# Patient Record
Sex: MALE | Race: WHITE
[De-identification: names, ages, dates, MRNs, and addresses within clinical notes are randomized per-mention and may not be internally consistent; named-entity substitution may affect disease eponyms.]

---

## 2017-07-10 ENCOUNTER — HOSPITAL ENCOUNTER (EMERGENCY)
Dept: HOSPITAL 47 - EC | Age: 27
Discharge: HOME | End: 2017-07-10
Payer: COMMERCIAL

## 2017-07-10 VITALS
SYSTOLIC BLOOD PRESSURE: 130 MMHG | DIASTOLIC BLOOD PRESSURE: 78 MMHG | TEMPERATURE: 98 F | RESPIRATION RATE: 18 BRPM | HEART RATE: 82 BPM

## 2017-07-10 DIAGNOSIS — H57.8: Primary | ICD-10-CM

## 2017-07-10 PROCEDURE — 99283 EMERGENCY DEPT VISIT LOW MDM: CPT

## 2017-07-10 NOTE — ED
Eye Problem HPI





- General


Chief complaint: Eye Problems


Stated complaint: Painful eye


Time Seen by Provider: 07/10/17 21:03


Source: patient, RN notes reviewed


Mode of arrival: ambulatory


Limitations: no limitations





- History of Present Illness


Initial comments: 





27-year-old male presents to the emergency Department chief complaint of left 

eye irritation.  Patient states that he was opening superglue and since then he'

s had some burning on and off to the eye.  Patient states he contacted poison 

control and told to flush his eye and he states he continued to have the 

emergency room 3 thought that he should be seen.  Patient denies any changes in 

vision any drainage from the eye.  Patient states that she stated burning type 

sensation he was concerned.  Patient states it is not currently having any 

other symptoms at this time.Patient denies any recent fever, chills, shortness 

of breath, chest pain, back pain, abdominal pain, nausea vomiting, numbness or 

tingling, dysuria or hematuria, constipation or diarrhea, headaches or visual 

changes, or any other current symptoms.





- Related Data


 Previous Rx's











 Medication  Instructions  Recorded


 


Erythromycin Ophth Oint [Romycin 1 applic LEFT EYE QID 3 Days 08/19/16





Ophth Oint]  











 Allergies











Allergy/AdvReac Type Severity Reaction Status Date / Time


 


No Known Allergies Allergy   Verified 07/10/17 20:57














Review of Systems


ROS Statement: 


Those systems with pertinent positive or pertinent negative responses have been 

documented in the HPI.





ROS Other: All systems not noted in ROS Statement are negative.





Past Medical History


Past Medical History: No Reported History


History of Any Multi-Drug Resistant Organisms: None Reported


Past Surgical History: Orthopedic Surgery


Additional Past Surgical History / Comment(s): bilat ankle,


Past Psychological History: No Psychological Hx Reported


Smoking Status: Never smoker


Past Alcohol Use History: Rare


Past Drug Use History: None Reported





General Exam


Limitations: no limitations


General appearance: alert, in no apparent distress


Head exam: Present: atraumatic, normocephalic, normal inspection


Eye exam: Present: normal appearance, PERRL, EOMI.  Absent: scleral icterus, 

conjunctival injection, periorbital swelling


ENT exam: Present: normal exam, mucous membranes moist


Neck exam: Present: normal inspection.  Absent: tenderness, meningismus, 

lymphadenopathy


Respiratory exam: Present: normal lung sounds bilaterally.  Absent: respiratory 

distress, wheezes, rales, rhonchi, stridor


Cardiovascular Exam: Present: regular rate, normal rhythm, normal heart sounds.

  Absent: systolic murmur, diastolic murmur, rubs, gallop, clicks


Neurological exam: Present: alert, oriented X3


Psychiatric exam: Present: normal affect, normal mood


Skin exam: Present: warm, dry, intact, normal color.  Absent: rash





Course


 Vital Signs











  07/10/17





  20:55


 


Temperature 98.0 F


 


Pulse Rate 82


 


Respiratory 18





Rate 


 


Blood Pressure 130/78


 


O2 Sat by Pulse 97





Oximetry 














- Reevaluation(s)


Reevaluation #1: 





07/10/17 21:39


patient's pain completely resolved with the proparacaine





Medical Decision Making





- Medical Decision Making





27-year-old male presents for left eye irritation.at this time patient 

underwent with flap examination does not show any corneal injury.  We did give 

the patient dose of Bactrim and informed to follow-up with ophthalmology 

morning.  We did discuss return parameters all patient's questions.  He stated 

he understood and is in agreement plan.  He will be discharged.





Disposition


Clinical Impression: 


 Irritation of left eye





Disposition: HOME SELF-CARE


Condition: Stable


Instructions:  Eye Wash (Into the eye)


Additional Instructions: 


Please use medication as discussed. Please follow up with family doctor if 

symptoms have not improved over the next two days. Please return to the 

emergency room if your symptoms increase or worsen or for any other concerns. 


Referrals: 


Mir Kyle MD [Primary Care Provider] - 1-2 days


Jhoan Baker MD [STAFF PHYSICIAN] - 1-2 days


Time of Disposition: 21:39

## 2017-07-18 ENCOUNTER — HOSPITAL ENCOUNTER (OUTPATIENT)
Dept: HOSPITAL 47 - RADMRIMAIN | Age: 27
Discharge: HOME | End: 2017-07-18
Payer: COMMERCIAL

## 2017-07-18 DIAGNOSIS — M54.2: ICD-10-CM

## 2017-07-18 DIAGNOSIS — M51.27: ICD-10-CM

## 2017-07-18 DIAGNOSIS — M48.06: Primary | ICD-10-CM

## 2017-07-18 DIAGNOSIS — M51.37: ICD-10-CM

## 2017-07-18 PROCEDURE — 72148 MRI LUMBAR SPINE W/O DYE: CPT

## 2017-07-18 PROCEDURE — 72141 MRI NECK SPINE W/O DYE: CPT

## 2017-07-18 NOTE — MR
EXAMINATION TYPE: MR lspine wo con

 

DATE OF EXAM: 7/18/2017

 

COMPARISON: NONE

 

HISTORY: neck pain back pain

 

TECHNIQUE: T1 and T2  axial and sagittal images of the lumbar spine are submitted.  

 

FINDINGS: There is no abnormal signal seen within the visualized spinal cord or paraspinal soft tissu
es.

 

At L1-2 there is hypertrophic change of the facets but no disc herniation or canal stenosis. No joan
inal encroachment.

 

At L2-3 there is no disc herniation or canal stenosis. No foraminal encroachment.

 

At L3-4 there is mild disc desiccation and circumferential disc bulging. Hypertrophic change of the f
acets. No Canal stenosis or foraminal encroachment.

 

At L4-5 there is central broad-based disc herniation with moderate effacement of thecal sac and canal
 stenosis. Facet arthropathy and ligamentum flavum hypertrophy with mild bilateral foraminal encroach
ment.

 

At L5-S1 there is facet arthropathy. No Canal stenosis. Circumferential disc bulging with mild forami
nal encroachment.

 

 

IMPRESSION:

1. Degenerative disc disease L3-S1 with disc desiccation.

2. Central broad-based disc herniation L4-L5 with moderate effacement of thecal sac and canal stenosi
s. Hypertrophic change of the facets and ligamentum flavum contribute to bilateral foraminal encroach
ment.

3. Circumferential disc bulging L5-S1 with mild bilateral foraminal encroachment.

 

 

 

 

 

EXAMINATION TYPE: MR dahiana wo con

 

DATE OF EXAM: 7/18/2017

 

COMPARISON: NONE

 

HISTORY: neck pain back pain

 

TECHNIQUE: T1 sagittal and coronal, T2 sagittal, and gradient echo axial views of the cervical spine 
are submitted.

 

FINDINGS: The cranial cervical junction is preserved.  There is no abnormal signal seen within the sp
inal cord or paraspinal soft tissues.

 

At C2-3 there is no disc herniation or canal stenosis. No foraminal encroachment.

 

At C3-4 there is there is no disc herniation or canal stenosis. Mild uncovertebral joint hypertrophy 
on the right. Very mild right-sided foraminal encroachment.

 

At C4-5 there is no disc herniation or canal stenosis. No foraminal encroachment.

 

At C5-6 there is disc herniation or canal stenosis. No foraminal encroachment. 

 

At C6-7 there is no disc herniation or canal stenosis. No foraminal encroachment.

 

At C7-T1 there is no disc herniation or canal stenosis. No foraminal encroachment.

 

 

IMPRESSION: 

1.  Mild uncovertebral joint hypertrophy C3-C4 on the right with very mild right-sided foraminal encr
oachment but no evidence of disc herniation or canal stenosis at any of the visualized levels.

## 2018-06-27 ENCOUNTER — HOSPITAL ENCOUNTER (EMERGENCY)
Dept: HOSPITAL 47 - EC | Age: 28
Discharge: HOME | End: 2018-06-27
Payer: COMMERCIAL

## 2018-06-27 VITALS — SYSTOLIC BLOOD PRESSURE: 128 MMHG | TEMPERATURE: 98.5 F | DIASTOLIC BLOOD PRESSURE: 70 MMHG | HEART RATE: 64 BPM

## 2018-06-27 VITALS — RESPIRATION RATE: 20 BRPM

## 2018-06-27 DIAGNOSIS — T15.91XA: Primary | ICD-10-CM

## 2018-06-27 PROCEDURE — 99283 EMERGENCY DEPT VISIT LOW MDM: CPT

## 2018-06-27 NOTE — ED
Eye Problem HPI





- General


Chief complaint: Eye Problems


Stated complaint: METAL SHAVING IN RT EYE NOT IHS


Time Seen by Provider: 06/27/18 14:52


Source: patient, RN notes reviewed


Mode of arrival: ambulatory


Limitations: no limitations





- History of Present Illness


Initial comments: 


This is a 27-year-old male who presents to the emergency department with chief 

complaint of right eye foreign body.  Patient states that approximately 2 and 

half hours ago he was grinding metal.  He states that a small shaving became 

lodged in his right eye.  He states that he did flush out the right eye and 

still felt a foreign body sensation on his drive over to the emergency 

department.  Patient states that currently he no longer has foreign body 

sensation or irritation.  He states he just wanted to be cautious.  Denies any 

vision changes, blurred vision or significant eye pain.  Does admit to minimal 

tearing.  Denies any concerns for foreign body in the left eye.  Patient does 

not wear contacts.  Denies recent illnesses or infections.  Denies fevers or 

chills, chest pain or shortness breath, abdominal pain, nausea or vomiting, 

headache or dizziness.








- Related Data


 Home Medications











 Medication  Instructions  Recorded  Confirmed


 


No Known Home Medications  06/27/18 06/27/18











 Allergies











Allergy/AdvReac Type Severity Reaction Status Date / Time


 


No Known Allergies Allergy   Verified 06/27/18 14:45














Review of Systems


ROS Statement: 


Those systems with pertinent positive or pertinent negative responses have been 

documented in the HPI.





ROS Other: All systems not noted in ROS Statement are negative.





Past Medical History


Past Medical History: No Reported History


History of Any Multi-Drug Resistant Organisms: None Reported


Past Surgical History: Orthopedic Surgery


Additional Past Surgical History / Comment(s): bilat ankle,


Past Psychological History: No Psychological Hx Reported


Smoking Status: Never smoker


Past Alcohol Use History: Rare


Past Drug Use History: None Reported





General Exam





- General Exam Comments


Initial Comments: 





General: Awake and alert, well-developed; in no apparent distress.


HEENT: Head atraumatic, normocephalic. Pupils are equal, round and reactive to 

light. Extraocular movements intact.  Bilateral conjunctiva are non-injected.  

No evidence of foreign body within the right eye.  No evidence of foreign body 

under superior or inferior eyelids.  No abrasions or ulcerations noted on 

fluorescein staining.  Oropharynx moist without erythema or exudate. 


Neck: Supple. Normal ROM. 


Cardiovascular: Regular rate and rhythm. No murmurs, rubs or gallops. Chest 

symmetrical.  


Respiratory: Lungs clear to auscultation bilaterally. No wheezes, rales or 

rhonchi. Normal respiratory effort with no use of accessory muscles. 


Musculoskeletal: Normal ROM, no tenderness bilateral upper and lower 

extremities. Ambulating normally. 


Skin: Pink, warm and dry without rashes or lesions. 


Neurological: Alert and oriented x3. CN II-XII grossly intact. Speech is fluent 

and answers are appropriate. No focal neuro deficits. 


Psychiatric: Normal mood and affect. No overt signs of depression or anxiety 

noted. 











Limitations: no limitations





Course





 Vital Signs











  06/27/18





  14:43


 


Temperature 98.2 F


 


Pulse Rate 72


 


Respiratory 20





Rate 


 


Blood Pressure 121/69


 


O2 Sat by Pulse 100





Oximetry 














Medical Decision Making





- Medical Decision Making


This is a 27-year-old male who presents to the emergency department with chief 

complaint of foreign body within the right eye.  Patient thought that he got a 

small metal shaving into his right eye approximately 2-1/2 hours ago.  He did 

flush it out.  Currently, patient denies any foreign body sensation and 

irritation.  Conjunctivae non-injected.  No evidence of foreign body within the 

eye or under eyelids.  No abrasions or ulcerations noted on fluorescein 

staining.  Extraocular movements are intact and pupils are equal round and 

reactive to light.  Patient will be started on erythromycin ointment for 

prevention of any infection if there is a minor abrasion that I am unable to 

visualize.  Vital signs are stable and patient is in no acute distress.  He 

will be discharged home at this time.  All questions answered.








Disposition


Clinical Impression: 


 Eye foreign body





Disposition: HOME SELF-CARE


Condition: Good


Instructions:  Eye Foreign Body (ED), Erythromycin (Into the eye)


Additional Instructions: 


Please apply a half inch ribbon of erythromycin to the affected eye every 6 

hours for the next 5 days.  Please follow up with primary care provider within 1

-2 days. Return to emergency department if symptoms should worsen or any 

concerns arise.  


Is patient prescribed a controlled substance at d/c from ED?: No


Referrals: 


Mir Kyle MD [Primary Care Provider] - 1-2 days


Time of Disposition: 15:38

## 2018-10-12 ENCOUNTER — HOSPITAL ENCOUNTER (OUTPATIENT)
Dept: HOSPITAL 47 - RADXRMAIN | Age: 28
Discharge: HOME | End: 2018-10-12
Payer: COMMERCIAL

## 2018-10-12 DIAGNOSIS — R93.7: Primary | ICD-10-CM

## 2018-10-12 NOTE — XR
EXAMINATION TYPE: XR ankle limited bilateral

 

DATE OF EXAM: 10/12/2018

 

CLINICAL HISTORY: Ankle pain

 

TECHNIQUE:  Frontal, and lateral of the bilateral ankle are obtained.

 

COMPARISON: None.

 

FINDINGS:  There is no acute fracture/dislocation evident in the bilateral ankle.  The ankle mortise 
appears within normal limits.  The overlying soft tissue appears unremarkable. Prominent bony density
 extending of the posterior margin of the talus bilaterally may be congenital.

 

IMPRESSION:  

1. There is no acute fracture or dislocation in the bilateral ankle.

2. There is a prominent bony protuberance extending off the posterior margin of the talus. 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 
congenital rather than representing osteochondroma. Follow-up bone scan could BE obtained as clinical
ly warranted.

## 2020-08-26 ENCOUNTER — HOSPITAL ENCOUNTER (OUTPATIENT)
Dept: HOSPITAL 47 - RAD | Age: 30
Discharge: HOME | End: 2020-08-26
Attending: PHYSICIAN ASSISTANT
Payer: COMMERCIAL

## 2020-08-26 DIAGNOSIS — M47.897: Primary | ICD-10-CM

## 2020-08-26 PROCEDURE — 72110 X-RAY EXAM L-2 SPINE 4/>VWS: CPT

## 2020-08-27 NOTE — XR
EXAMINATION TYPE: XR lumbosacral spine min 4V

 

DATE OF EXAM: 8/26/2020

 

CLINICAL HISTORY: Sciatica pain down right leg. M54.31

 

TECHNIQUE: Frontal, lateral, and oblique images of the lumbar spine are obtained.

 

COMPARISON: None

 

FINDINGS:  There are 5 lumbar type vertebral bodies identified.  The lumbar spine shows satisfactory 
alignment without evidence of acute fracture or dislocation. Vertebral body heights and disk space he
ights are within normal limits. No evidence of spondylolysis or spondylolisthesis. Mild facet arthrop
athy L5-S1. The overlying soft tissue appears unremarkable.

 

IMPRESSION:  

1. No acute fracture or dislocation is seen in the lumbar spine.

2. Mild facet arthropathy at L5-S1.

## 2020-09-07 ENCOUNTER — HOSPITAL ENCOUNTER (EMERGENCY)
Dept: HOSPITAL 47 - EC | Age: 30
Discharge: HOME | End: 2020-09-07
Payer: COMMERCIAL

## 2020-09-07 VITALS — HEART RATE: 69 BPM | TEMPERATURE: 98 F | DIASTOLIC BLOOD PRESSURE: 79 MMHG | SYSTOLIC BLOOD PRESSURE: 114 MMHG

## 2020-09-07 VITALS — RESPIRATION RATE: 18 BRPM

## 2020-09-07 DIAGNOSIS — M54.41: Primary | ICD-10-CM

## 2020-09-07 DIAGNOSIS — Z98.890: ICD-10-CM

## 2020-09-07 PROCEDURE — 96372 THER/PROPH/DIAG INJ SC/IM: CPT

## 2020-09-07 PROCEDURE — 99283 EMERGENCY DEPT VISIT LOW MDM: CPT

## 2020-09-07 NOTE — ED
Back Pain HPI





- General


Chief Complaint: Back Pain/Injury


Stated Complaint: back & leg pain


Time Seen by Provider: 09/07/20 08:36


Source: patient


Limitations: no limitations





- History of Present Illness


Initial Comments: 





Patient is a 30-year-old male presenting to emergency Department with complaints

of right-sided low back pain with some radiation into his right leg.  Patient 

states he has seen his PCP for this and they did start him on Neurontin which 

has not seemed to help.  Patient states he also has muscle relaxers at home that

he takes at nighttime and has been trying Aleve as well.  Patient states he also

has a prescription for physical therapy which he did go to the first appointment

but did not like the physical therapist so he is switching to another facility. 

Patient states the last 2 days the pain has increased so he came to the ER for 

evaluation.  He denies any injuries or falls.  He denies any fever or chills.  

He denies any set up her seizure or urinary incontinence.  He denies any lower 

back surgeries.  He has no further complaints at this time.  Upon arrival to the

ER, his vital signs are stable.





- Related Data


                                  Previous Rx's











 Medication  Instructions  Recorded


 


predniSONE [Deltasone] 20 mg PO BID 5 Days #10 tab 09/07/20











                                    Allergies











Allergy/AdvReac Type Severity Reaction Status Date / Time


 


No Known Allergies Allergy   Verified 09/07/20 08:34














Review of Systems


ROS Statement: 


Those systems with pertinent positive or pertinent negative responses have been 

documented in the HPI.





ROS Other: All systems not noted in ROS Statement are negative.





Past Medical History


Past Medical History: No Reported History


History of Any Multi-Drug Resistant Organisms: None Reported


Past Surgical History: Orthopedic Surgery


Additional Past Surgical History / Comment(s): bilat ankle,


Past Psychological History: No Psychological Hx Reported


Smoking Status: Never smoker


Past Alcohol Use History: Rare


Past Drug Use History: None Reported





General Exam





- General Exam Comments


Initial Comments: 





GENERAL: 


Patient is well-developed and well-nourished.  Patient is nontoxic and in no 

acute distress.





HEAD: 


Atraumatic, normocephalic.





EYES:


Pupils equal round and reactive to light, extraocular movements intact, sclera 

anicteric, conjunctiva are normal.  Eyelids were unremarkable.





ENT: 


TMs normal, nares patent, oropharynx clear without exudates.  Moist mucous 

membranes.





NECK: 


Normal range of motion, supple without lymphadenopathy or JVD.





LUNGS:


Unlabored respirations.  Breath sounds clear to auscultation bilaterally and 

equal.  No wheezes rales or rhonchi.





HEART:


Regular rate and rhythm without murmurs, rubs or gallops.





ABDOMEN: 


Soft, nontender, normoactive bowel sounds.  No guarding, no rebound.  No masses 

appreciated.





: Deferred 





MUSCULOSKELETAL: 


Patient has some mild pain with palpation of the right SI area, increased with 

straight leg raise.  Patient has 5 out of 5 strength of lower extremities 

bilaterally, sensation is equal and bilateral.  Patient has strong glue 

contraction.  No pitting or edema.  No clubbing or cyanosis.





NEUROLOGICAL: 


Patient is alert and oriented x 3.  Motor and sensory are also intact.  Cranial 

nerves II through XII grossly intact.  Symmetrical smile.  Normal speech, normal

 gait.   





PSYCH:


Normal mood, normal affect.





SKIN:


 Warm, Dry, normal turgor, no rashes or lesions noted.


Limitations: no limitations





Course


                                   Vital Signs











  09/07/20 09/07/20





  08:30 09:18


 


Temperature 98.4 F 98 F


 


Pulse Rate 68 69


 


Respiratory 18 18





Rate  


 


Blood Pressure 122/69 114/79


 


O2 Sat by Pulse 99 97





Oximetry  














Medical Decision Making





- Medical Decision Making





Patient is a 30-year-old male here for right-sided low back pain as well as some

 mild sciatica.  His exam is unremarkable, no neural deficits.  Patient is 

already taking Neurontin.  I discussed with patient that we can give him Toradol

 today as well as started on a small dose of steroids for the inflammation.  

Patient is also requesting some sort of pain medication.  I will give him a 

starter pack of tramadol.  Patient is agreement with this plan of care.  He will

 continue with his physical therapy and follow up with his PCP.  Return 

parameters were discussed with the patient and he verbalized understanding.  

Case discussed with Dr. Davison. 





Disposition


Clinical Impression: 


 Right-sided low back pain with right-sided sciatica





Disposition: HOME SELF-CARE


Condition: Stable


Instructions (If sedation given, give patient instructions):  Sciatica (ED)


Additional Instructions: 


Please return to the Emergency Department if symptoms worsen or any other 

concerns.


Take medication as prescribed.


Continue with physical therapy as discussed.  Use eye ice and/or heat on the 

area, gentle stretching.


Follow-up with PCP.


Prescriptions: 


predniSONE [Deltasone] 20 mg PO BID 5 Days #10 tab


Is patient prescribed a controlled substance at d/c from ED?: No


Referrals: 


Mir Kyle MD [Primary Care Provider] - 1-2 days

## 2021-03-06 ENCOUNTER — HOSPITAL ENCOUNTER (EMERGENCY)
Dept: HOSPITAL 47 - EC | Age: 31
Discharge: HOME | End: 2021-03-06
Payer: COMMERCIAL

## 2021-03-06 VITALS
SYSTOLIC BLOOD PRESSURE: 117 MMHG | TEMPERATURE: 97.4 F | DIASTOLIC BLOOD PRESSURE: 67 MMHG | RESPIRATION RATE: 18 BRPM | HEART RATE: 79 BPM

## 2021-03-06 DIAGNOSIS — S39.012A: ICD-10-CM
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
## 2021-03-06 DIAGNOSIS — M51.26: Primary | ICD-10-CM

## 2021-03-06 PROCEDURE — 96372 THER/PROPH/DIAG INJ SC/IM: CPT

## 2021-03-06 PROCEDURE — 99283 EMERGENCY DEPT VISIT LOW MDM: CPT

## 2021-03-06 NOTE — ED
General Adult HPI





- General


Chief complaint: Back Pain/Injury


Stated complaint: back pain


Time Seen by Provider: 03/06/21 12:30


Source: patient, RN notes reviewed, old records reviewed


Mode of arrival: ambulatory


Limitations: no limitations





- History of Present Illness


Initial comments: 





30-year-old male with chronic back pain presenting for evaluation of worsening 

pain.  Patient has been seen by the neurology and spine center locally.  He is 

currently on naproxen and Norco.  He states he had an MRI about 6 months ago 

which showed some herniated disks.  He states that he did have some numbness and

tingling into his right leg which has improved over the past several months.  He

denies weakness.  Patient denies any new trauma.  Denies fever or chills.  

Denies any constitutional symptoms.  No dysuria or bladder or bowel 

incontinence.





- Related Data


                                  Previous Rx's











 Medication  Instructions  Recorded


 


predniSONE [Deltasone] 20 mg PO BID 5 Days #10 tab 09/07/20


 


Ibuprofen [Motrin] 600 mg PO Q8HR PRN #24 tab 03/06/21











                                    Allergies











Allergy/AdvReac Type Severity Reaction Status Date / Time


 


No Known Allergies Allergy   Verified 03/06/21 12:25














Review of Systems


ROS Statement: 


Those systems with pertinent positive or pertinent negative responses have been 

documented in the HPI.





ROS Other: All systems not noted in ROS Statement are negative.





Past Medical History


Past Medical History: No Reported History


Additional Past Medical History / Comment(s): herniated disc in lower back.


History of Any Multi-Drug Resistant Organisms: None Reported


Past Surgical History: Orthopedic Surgery


Additional Past Surgical History / Comment(s): bilat ankle,


Past Psychological History: No Psychological Hx Reported


Smoking Status: Never smoker


Past Alcohol Use History: Rare


Past Drug Use History: None Reported





General Exam


Limitations: no limitations


General appearance: alert, in no apparent distress


Head exam: Present: atraumatic, normocephalic


Eye exam: Present: normal appearance, PERRL


ENT exam: Present: normal exam


Neck exam: Present: normal inspection.  Absent: tenderness, meningismus


Respiratory exam: Present: normal lung sounds bilaterally.  Absent: respiratory 

distress


Cardiovascular Exam: Present: regular rate, normal rhythm


GI/Abdominal exam: Present: soft.  Absent: distended, tenderness, guarding


Extremities exam: Present: normal inspection, full ROM, normal capillary refill.

  Absent: tenderness, pedal edema, joint swelling


Back exam: Present: normal inspection, paraspinal tenderness


Neurological exam: Present: alert, oriented X3, CN II-XII intact.  Absent: motor

 sensory deficit


Psychiatric exam: Present: normal affect, normal mood


Skin exam: Present: warm, dry, intact.  Absent: cyanosis, diaphoretic





Course





                                   Vital Signs











  03/06/21





  12:23


 


Temperature 97.4 F L


 


Pulse Rate 79


 


Respiratory 18





Rate 


 


Blood Pressure 117/67


 


O2 Sat by Pulse 97





Oximetry 














Medical Decision Making





- Medical Decision Making





30-year-old with chronic low back pain, requesting Toradol shot for symptom 

control.  No alarming features on history or physical exam.  Patient has good 

follow-up as an outpatient.  He will continue to follow with his primary care 

physician and the neurology Center.  He will return with worsening or changing 

symptoms.





Disposition


Clinical Impression: 


 Strain of lumbar region, Herniated lumbar intervertebral disc





Disposition: HOME SELF-CARE


Condition: Good


Instructions (If sedation given, give patient instructions):  Acute Low Back 

Pain (ED)


Additional Instructions: 


Discontinue naproxen, start Motrin, follow with your low back specialist.


Prescriptions: 


Ibuprofen [Motrin] 600 mg PO Q8HR PRN #24 tab


 PRN Reason: Pain


Is patient prescribed a controlled substance at d/c from ED?: No


Referrals: 


Mir Kyle MD [Primary Care Provider] - 1-2 days


Time of Disposition: 13:02

## 2021-04-22 ENCOUNTER — HOSPITAL ENCOUNTER (OUTPATIENT)
Dept: HOSPITAL 47 - LABWHC1 | Age: 31
Discharge: HOME | End: 2021-04-22
Attending: NURSE PRACTITIONER
Payer: COMMERCIAL

## 2021-04-22 DIAGNOSIS — E05.90: ICD-10-CM

## 2021-04-22 DIAGNOSIS — E55.9: Primary | ICD-10-CM

## 2021-04-22 LAB
BASOPHILS # BLD AUTO: 0.02 X 10*3/UL (ref 0–0.1)
BASOPHILS NFR BLD AUTO: 0.2 %
EOSINOPHIL # BLD AUTO: 0.16 X 10*3/UL (ref 0.04–0.35)
EOSINOPHIL NFR BLD AUTO: 1.9 %
ERYTHROCYTE [DISTWIDTH] IN BLOOD BY AUTOMATED COUNT: 4.51 X 10*6/UL (ref 4.4–5.6)
ERYTHROCYTE [DISTWIDTH] IN BLOOD: 13 % (ref 11.5–14.5)
HCT VFR BLD AUTO: 41 % (ref 39.6–50)
HGB BLD-MCNC: 13.5 G/DL (ref 13–17)
LYMPHOCYTES # SPEC AUTO: 1.42 X 10*3/UL (ref 0.9–5)
LYMPHOCYTES NFR SPEC AUTO: 16.9 %
MCH RBC QN AUTO: 29.9 PG (ref 27–32)
MCHC RBC AUTO-ENTMCNC: 32.9 G/DL (ref 32–37)
MCV RBC AUTO: 90.9 FL (ref 80–97)
MONOCYTES # BLD AUTO: 0.65 X 10*3/UL (ref 0.2–1)
MONOCYTES NFR BLD AUTO: 7.7 %
NEUTROPHILS # BLD AUTO: 6.12 X 10*3/UL (ref 1.8–7.7)
NEUTROPHILS NFR BLD AUTO: 73.1 %
PLATELET # BLD AUTO: 198 X 10*3/UL (ref 140–440)
WBC # BLD AUTO: 8.39 X 10*3/UL (ref 4.5–10)

## 2021-04-22 PROCEDURE — 84443 ASSAY THYROID STIM HORMONE: CPT

## 2021-04-22 PROCEDURE — 36415 COLL VENOUS BLD VENIPUNCTURE: CPT

## 2021-04-22 PROCEDURE — 85025 COMPLETE CBC W/AUTO DIFF WBC: CPT

## 2021-04-22 PROCEDURE — 84481 FREE ASSAY (FT-3): CPT

## 2021-04-22 PROCEDURE — 82607 VITAMIN B-12: CPT

## 2021-04-22 PROCEDURE — 82306 VITAMIN D 25 HYDROXY: CPT

## 2021-04-22 PROCEDURE — 84439 ASSAY OF FREE THYROXINE: CPT

## 2021-04-22 PROCEDURE — 80053 COMPREHEN METABOLIC PANEL: CPT

## 2021-04-22 PROCEDURE — 84207 ASSAY OF VITAMIN B-6: CPT

## 2021-04-23 LAB
ALBUMIN SERPL-MCNC: 4.5 G/DL (ref 3.8–4.9)
ALBUMIN/GLOB SERPL: 2.65 G/DL (ref 1.6–3.17)
ALP SERPL-CCNC: 53 U/L (ref 41–126)
ALT SERPL-CCNC: 30 U/L (ref 10–49)
ANION GAP SERPL CALC-SCNC: 5.3 MMOL/L (ref 4–12)
AST SERPL-CCNC: 23 U/L (ref 14–35)
BUN SERPL-SCNC: 17 MG/DL (ref 9–27)
BUN/CREAT SERPL: 18.89 RATIO (ref 12–20)
CALCIUM SPEC-MCNC: 9.2 MG/DL (ref 8.7–10.3)
CHLORIDE SERPL-SCNC: 108 MMOL/L (ref 96–109)
CO2 SERPL-SCNC: 28.7 MMOL/L (ref 21.6–31.8)
GLOBULIN SER CALC-MCNC: 1.7 G/DL (ref 1.6–3.3)
GLUCOSE SERPL-MCNC: 81 MG/DL (ref 70–110)
POTASSIUM SERPL-SCNC: 4.8 MMOL/L (ref 3.5–5.5)
PROT SERPL-MCNC: 6.2 G/DL (ref 6.2–8.2)
SODIUM SERPL-SCNC: 142 MMOL/L (ref 135–145)
T4 FREE SERPL-MCNC: 1 NG/DL (ref 0.8–1.8)
VIT B12 SERPL-MCNC: 227 PG/ML (ref 200–944)

## 2021-05-28 ENCOUNTER — HOSPITAL ENCOUNTER (EMERGENCY)
Dept: HOSPITAL 47 - EC | Age: 31
Discharge: HOME | End: 2021-05-28
Payer: COMMERCIAL

## 2021-05-28 VITALS — DIASTOLIC BLOOD PRESSURE: 65 MMHG | HEART RATE: 68 BPM | SYSTOLIC BLOOD PRESSURE: 117 MMHG

## 2021-05-28 VITALS — RESPIRATION RATE: 18 BRPM | TEMPERATURE: 98 F

## 2021-05-28 DIAGNOSIS — R11.0: Primary | ICD-10-CM

## 2021-05-28 DIAGNOSIS — Z51.89: ICD-10-CM

## 2021-05-28 PROCEDURE — 99281 EMR DPT VST MAYX REQ PHY/QHP: CPT

## 2021-05-28 NOTE — ED
General Adult HPI





- General


Chief complaint: Recheck/Abnormal Lab/Rx


Stated complaint: Medication reaction


Time Seen by Provider: 05/28/21 12:52


Source: patient, RN notes reviewed


Mode of arrival: ambulatory


Limitations: no limitations





- History of Present Illness


Initial comments: 





Patient is a pleasant 30-year-old male presenting to the emergency Department 

with complaints of concerns regarding withdrawal from Lyrica.  Patient states a 

few days ago he stopped taking this completely.  Patient states he was on 150 mg

3 times daily.  Patient was off the medication for 3 days.  Patient spoke with 

his doctor and restarted 1 pill per day starting yesterday.  Patient is feeling 

lightheaded and some difficulty with concentrating.  Patient has mild nausea.  

No suicidal thoughts.





- Related Data


                                  Previous Rx's











 Medication  Instructions  Recorded


 


predniSONE [Deltasone] 20 mg PO BID 5 Days #10 tab 09/07/20


 


Ibuprofen [Motrin] 600 mg PO Q8HR PRN #24 tab 03/06/21











                                    Allergies











Allergy/AdvReac Type Severity Reaction Status Date / Time


 


No Known Allergies Allergy   Verified 05/28/21 12:41














Review of Systems


ROS Statement: 


Those systems with pertinent positive or pertinent negative responses have been 

documented in the HPI.





ROS Other: All systems not noted in ROS Statement are negative.


Constitutional: Denies: fever


Eyes: Denies: eye pain


ENT: Denies: ear pain


Respiratory: Denies: cough


Cardiovascular: Denies: chest pain


Endocrine: Denies: fatigue


Gastrointestinal: Denies: abdominal pain


Genitourinary: Denies: dysuria


Musculoskeletal: Denies: back pain


Skin: Denies: rash


Neurological: Denies: headache, weakness, confusion


Psychiatric: Denies: suicidal thoughts





Past Medical History


Past Medical History: No Reported History


Additional Past Medical History / Comment(s): herniated disc in lower back.


History of Any Multi-Drug Resistant Organisms: None Reported


Past Surgical History: Orthopedic Surgery


Additional Past Surgical History / Comment(s): bilat ankle,


Past Psychological History: No Psychological Hx Reported


Smoking Status: Never smoker


Past Alcohol Use History: Rare


Past Drug Use History: None Reported





General Exam


Limitations: no limitations


General appearance: alert, in no apparent distress


Head exam: Present: normocephalic


Eye exam: Present: normal appearance, PERRL


Neck exam: Present: normal inspection


Respiratory exam: Present: normal lung sounds bilaterally


Cardiovascular Exam: Present: regular rate, normal rhythm


GI/Abdominal exam: Present: soft.  Absent: tenderness


Extremities exam: Present: normal inspection


Neurological exam: Present: alert.  Absent: motor sensory deficit


Psychiatric exam: Present: normal affect, normal mood


Skin exam: Present: normal color





Course





                                   Vital Signs











  05/28/21





  12:38


 


Temperature 98.0 F


 


Pulse Rate 73


 


Respiratory 18





Rate 


 


Blood Pressure 118/72


 


O2 Sat by Pulse 100





Oximetry 














Medical Decision Making





- Medical Decision Making





Patient agreeable with plan to increase Lyrica dose to twice daily starting 

today including tomorrow and Sunday.  Start with one daily on Monday for the 5 

days and every other day for 5 days following that.  Patient is to follow-up 

with his primary care physician next week ago for plan.





Disposition


Clinical Impression: 


 Encounter for medication adjustment





Disposition: HOME SELF-CARE


Condition: Stable


Instructions (If sedation given, give patient instructions):  Pregabalin (By 

mouth)


Additional Instructions: 


Starting today and including tomorrow (Saturday)and Sunday dose twice daily.  

Starting on Monday decreased dose down to 1 tablet daily for the next 5 days.  

After this adjust dose to 1 tablet every other day for 5 days.  Please do 

follow-up through primary care physician in the beginning of the week to 

reevaluate and go over the planned.  Return for worsening symptoms, thoughts of 

self-harm, or any other concerns.


Is patient prescribed a controlled substance at d/c from ED?: No


Referrals: 


Preston Callahan MD [Primary Care Provider] - 1-2 days


Time of Disposition: 13:18

## 2023-06-20 ENCOUNTER — HOSPITAL ENCOUNTER (OUTPATIENT)
Dept: HOSPITAL 47 - RADXRMAIN | Age: 33
Discharge: HOME | End: 2023-06-20
Attending: PHYSICIAN ASSISTANT
Payer: COMMERCIAL

## 2023-06-20 DIAGNOSIS — S52.602A: Primary | ICD-10-CM

## 2023-06-20 DIAGNOSIS — M85.642: ICD-10-CM

## 2023-06-20 DIAGNOSIS — M25.532: ICD-10-CM

## 2023-06-20 NOTE — XR
EXAMINATION TYPE: XR wrist complete LT

 

DATE OF EXAM: 6/20/2023

 

COMPARISON: NONE

 

HISTORY: Pain

 

TECHNIQUE: Four views submitted.

 

FINDINGS:

The osseous structures are intact.  The joint spaces are preserved and there is no acute fracture or 
dislocation.  Deformity distal left ulna suggestive of remote trauma. Benign-appearing cyst scaphoid.


 

IMPRESSION:

1.  No definite acute fracture or dislocation if symptoms persist, follow-up study in 7 to 10 days wo
uld be suggested

## 2023-09-19 NOTE — XR
1.56 EXAMINATION TYPE: XR hand limited LT

 

DATE OF EXAM: 6/20/2023

 

COMPARISON: 3/30/2012

 

HISTORY: Pain

 

TECHNIQUE: Three views are submitted.

 

FINDINGS:

The osseous structures are intact.  The joint spaces are preserved and there is no acute fracture or 
dislocation.Chronic deformity of the distal ulna. There is a small lucency involving the scaphoid. Fi
ndings compatible with benign cyst. 

 

IMPRESSION:

1.  No definite acute fracture or dislocation if symptoms persist, follow-up study in 7 to 10 days wo
uld be suggested

2. Remote fracture distal ulna. Benign-appearing cyst scaphoid. If symptoms persist consider follow-u
p MRI.